# Patient Record
Sex: MALE | Race: WHITE
[De-identification: names, ages, dates, MRNs, and addresses within clinical notes are randomized per-mention and may not be internally consistent; named-entity substitution may affect disease eponyms.]

---

## 2021-08-05 ENCOUNTER — HOSPITAL ENCOUNTER (INPATIENT)
Dept: HOSPITAL 11 - JP.ED | Age: 26
LOS: 2 days | Discharge: HOME | DRG: 263 | End: 2021-08-07
Attending: INTERNAL MEDICINE | Admitting: INTERNAL MEDICINE
Payer: COMMERCIAL

## 2021-08-05 DIAGNOSIS — K80.13: Primary | ICD-10-CM

## 2021-08-05 DIAGNOSIS — Z20.822: ICD-10-CM

## 2021-08-05 DIAGNOSIS — K42.0: ICD-10-CM

## 2021-08-05 DIAGNOSIS — F17.200: ICD-10-CM

## 2021-08-05 PROCEDURE — 0FT44ZZ RESECTION OF GALLBLADDER, PERCUTANEOUS ENDOSCOPIC APPROACH: ICD-10-PCS | Performed by: SURGERY

## 2021-08-05 PROCEDURE — 0WQF4ZZ REPAIR ABDOMINAL WALL, PERCUTANEOUS ENDOSCOPIC APPROACH: ICD-10-PCS | Performed by: SURGERY

## 2021-08-05 PROCEDURE — U0002 COVID-19 LAB TEST NON-CDC: HCPCS

## 2021-08-05 RX ADMIN — SODIUM CHLORIDE SCH MLS/HR: 9 INJECTION, SOLUTION INTRAVENOUS at 23:05

## 2021-08-05 RX ADMIN — SODIUM CHLORIDE SCH MLS/HR: 9 INJECTION, SOLUTION INTRAVENOUS at 16:49

## 2021-08-05 NOTE — CRLUS
For Patients:  As a result of the 21st Century Cures Act, medical imaging 

exams and procedure reports are released immediately into your electronic 

medical record.  You may view this report before your referring provider.  

If you have questions, please contact your health care provider.



INDICATION:



Right upper quadrant/epigastric abdominal pain. Diarrhea.



TECHNIQUE:



Right upper quadrant ultrasound.



FINDINGS:



Sludge and small sand like stones are identified within the gallbladder. 

There is a fold within the gallbladder of no clinical significance. The 

gallbladder wall is not thickened in general, however the gallbladder wall 

is measured at 5 mm which may be an overestimate. Additionally the 

gallbladder is distended measuring 11.3 cm in cephalocaudal extent. There 

is a questionable slender focus of fluid along one edge of the gallbladder 

wall. Reportedly there is a sonographic Olson`s sign. Clinical and 

laboratory correlation recommended. A HIDA scan may be helpful for further 

evaluation.



Mildly echogenic liver. No intrahepatic mass. No intrahepatic biliary 

ductal dilatation. The right hepatic lobe is prominent measuring 17.8 cm. 

Normal common bile duct of 6 mm. The main portal vein is patent with flow 

in the normal direction. The pancreas although incompletely visualized is 

normal where seen. The right kidney measures 11.2 x 5.4 x 6.3 cm. The right 

renal cortex measures 1.9 cm. The visualized inferior vena cava is 

unremarkable. No upper abdominal ascites.



IMPRESSION:



1. Stones and sludge are identified in the gallbladder. The gallbladder 

appears to be distended measuring 11.3 cm in cephalocaudal extent. This 

could reflect gallbladder hydrops.



2. Please note there is a reported sonographic Olson`s sign documented by 

the ultrasonographer. Clinical and laboratory correlation is recommended. A 

HIDA scan may be helpful. 



3. Questionable trace fluid near the gallbladder wall.



4. Mildly echogenic liver. Incomplete visualization of the pancreas. No 

hydronephrosis of the right kidney.



Dictated by Jonatan Conner MD @ 8/5/2021 11:24:06 AM



Signed by Dr. Jonatan Conner @ Aug  5 2021 11:24AM

## 2021-08-05 NOTE — PCM.HP.2
H&P History of Present Illness





- General


Date of Service: 08/05/21


Admit Problem/Dx: 


                           Admission Diagnosis/Problem





Admission Diagnosis/Problem      Cholecystitis








Source of Information: Patient, Family, Provider, RN Notes Reviewed


History Limitations: Reports: No Limitations





- History of Present Illness


Initial Comments - Free Text/Narative: 





Mr. Palm is a 25-year-old gentleman who was admitted through the emergency 

department with nausea and right upper quadrant abdominal pain secondary to 

acute cholecystitis.  He is noted symptoms of right upper quadrant abdominal 

pain intermittently over the past 2 weeks.  Pain became very severe early this 

morning and persisted until he came into the emergency department.  White blood 

cell count is modestly elevated, as are liver enzymes.  Ultrasound shows 

evidence of acute cholecystitis with wall thickening and stones within the gall

bladder.  He is otherwise very healthy and currently takes no medications.  He 

denies any prior abdominal surgery.  He has had previous surgery on his knee 

with general anesthetic and had no adverse reactions that he is aware of and 

there is no family history of adverse reaction to anesthesia.  He denies 

personal history of deep vein thrombosis or pulmonary embolism and has had no 

history of cardiac or pulmonary disease.


  ** Abdominal


Pain Score (Numeric/FACES): 6





- Related Data


Allergies/Adverse Reactions: 


                                    Allergies











Allergy/AdvReac Type Severity Reaction Status Date / Time


 


No Known Allergies Allergy   Verified 08/05/21 09:02











Home Medications: 


                                    Home Meds





NK [No Known Home Meds]  08/05/21 [History]











Past Medical History





- Past Health History


Medical/Surgical History: Denies Medical/Surgical History





- Infectious Disease History


Infectious Disease History: Reports: None





Social & Family History





- Tobacco Use


Tobacco Use Status *Q: Current Every Day Tobacco User


Years of Tobacco use: 7


Packs/Tins Daily: 1


Used Tobacco, but Quit: No





- Caffeine Use


Caffeine Use: Reports: Soda





- Recreational Drug Use


Recreational Drug Use: No





H&P Review of Systems





- Review of Systems:


Review Of Systems: See Below


General: Reports: Chills, Malaise, Diaphoresis.  Denies: Fever, Fatigue


HEENT: Reports: No Symptoms


Pulmonary: Reports: No Symptoms


Cardiovascular: Reports: No Symptoms


Gastrointestinal: Reports: Abdominal Pain, Nausea.  Denies: Constipation, 

Diarrhea, Difficulty Swallowing, Distension, Hematemesis, Hematochezia, Melena, 

Vomiting


Genitourinary: Reports: No Symptoms


Musculoskeletal: Reports: No Symptoms


Skin: Reports: No Symptoms


Psychiatric: Reports: No Symptoms


Neurological: Reports: No Symptoms


Hematologic/Lymphatic: Reports: No Symptoms


Immunologic: Reports: No Symptoms





Exam





- Exam


Exam: See Below





- Vital Signs


Vital Signs: 


                                Last Vital Signs











Temp  97.7 F   08/05/21 09:03


 


Pulse  65   08/05/21 09:03


 


Resp  16   08/05/21 09:03


 


BP  125/88   08/05/21 09:03


 


Pulse Ox  100   08/05/21 09:03











Weight: 235 lb 0.204 oz





- Exam


General: Alert, Oriented, Cooperative, Moderate Distress


HEENT: PERRLA, Conjunctiva Clear, EACs Clear, Hearing Intact, Mucosa Moist & 

Pink, Normal Nasal Septum, Posterior Pharynx Clear, Pupils Equal


Neck: Supple, Trachea Midline, +2 Carotid Pulse wo Bruit


Lungs: Clear to Auscultation, Normal Respiratory Effort


Cardiovascular: Regular Rate, Regular Rhythm, Normal S1, Normal S2.  No: Syst

olic Murmur, Diastolic Murmur


GI/Abdominal Exam: Soft, Non-Tender, No Organomegaly, No Distention


Back Exam: Normal Inspection, Full Range of Motion


Extremities: Non-Tender, No Pedal Edema


Skin: Warm, Dry, Intact


Neurological: Cranial Nerves Intact, Strength Equal Bilateral, Normal Speech, 

Normal Tone, Sensation Intact.  No: Focal Deficit


Neuro Extensive - Mental Status: Alert, Oriented x3, Normal Mood/Affect, Normal 

Cognition, Memory Intact





- Patient Data


Lab Results Last 24 hrs: 


                         Laboratory Results - last 24 hr











  08/05/21 08/05/21 Range/Units





  09:55 09:55 


 


WBC  11.4 H   (4.5-11.0)  K/uL


 


RBC  5.17   (4.30-5.90)  M/uL


 


Hgb  15.0   (12.0-15.0)  g/dL


 


Hct  43.8   (40.0-54.0)  %


 


MCV  85   (80-98)  fL


 


MCH  29   (27-31)  pg


 


MCHC  34   (32-36)  %


 


Plt Count  262   (150-400)  K/uL


 


Sodium   140  (140-148)  mmol/L


 


Potassium   3.9  (3.6-5.2)  mmol/L


 


Chloride   102  (100-108)  mmol/L


 


Carbon Dioxide   27  (21-32)  mmol/L


 


Anion Gap   11.2  (5.0-14.0)  mmol/L


 


BUN   9  (7-18)  mg/dL


 


Creatinine   1.0  (0.8-1.3)  mg/dL


 


Est Cr Clr Drug Dosing   141.05  mL/min


 


Estimated GFR (MDRD)   > 60  (>60)  


 


Glucose   110 H  ()  mg/dL


 


Calcium   9.1  (8.5-10.1)  mg/dL


 


Total Bilirubin   1.1 H  (0.2-1.0)  mg/dL


 


AST   198 H  (15-37)  U/L


 


ALT   166 H  (12-78)  U/L


 


Alkaline Phosphatase   109  ()  U/L


 


C-Reactive Protein   < 0.05  (0.0-0.3)  mg/dL


 


Total Protein   8.1  (6.4-8.2)  g/dL


 


Albumin   4.1  (3.4-5.0)  g/dL


 


Globulin   4.0 H  (2.3-3.5)  g/dL


 


Albumin/Globulin Ratio   1.0 L  (1.2-2.2)  











Result Diagrams: 


                                 08/05/21 09:55





                                 08/05/21 09:55





Sepsis Event Note





- Evaluation


Sepsis Screening Result: No Definite Risk





- Focused Exam


Vital Signs: 


                                   Vital Signs











  Temp Pulse Resp BP Pulse Ox


 


 08/05/21 09:03  97.7 F  65  16  125/88  100


 


 08/05/21 09:02  97.7 F  65  16  125/88  100














*Q Meaningful Use (ADM)





- VTE *Q


VTE Pharmacological Contraindications *Q: Patient Scheduled Surgery





- VTE Risk Assess *Q


Each Risk Factor Represents 1 Point: Obesity ( BMI > 25 kg/m2)


Total Score 1 Point Risk Factors: 1


Each Risk Factor Represents 2 Points: None


Total Score 2 Point Risk Factors: 0


Each Risk Factor Represents 3 Points: None


Total Score 3 Point Risk Factors: 0


Each Risk Factor Represents 5 Points: None


Total Score 5 Point Risk Factors: 0


Venous Thromboembolism Risk Factor Score *Q: 1


Problem List Initiated/Reviewed/Updated: Yes


Orders Last 24hrs: 


                               Active Orders 24 hr











 Category Date Time Status


 


 Patient Status Manage Transfer [TRANSFER] Routine ADT  08/05/21 11:04 Ordered


 


 Gallbladder [Abdomen Ltd] [US] Stat Exams  08/05/21 09:42 Taken


 


 UA W/MICROSCOPIC [URIN] Stat Lab  08/05/21 09:42 Ordered


 


 Ampicillin/Sulbactam Na [Unasyn] 3 gm Med  08/05/21 11:15 Active





 Sodium Chloride 0.9% [Normal Saline] 100 ml   





 IV Q6H   


 


 Resuscitation Status Routine Resus Stat  08/05/21 11:05 Ordered








                                Medication Orders





Ampicillin Sodium/Sulbactam (Sodium 3 gm/ Sodium Chloride)  100 mls @ 200 mls/hr

IV Q6H [facility]








Assessment/Plan Comment:: 





ASSESSMENT AND PLAN





ACUTE CHOLECYSTITIS-symptoms present over the past 2 weeks on an intermittent 

basis.  More severe since early this morning.  Liver enzymes elevated and 

evidence of cholecystitis noted on ultrasound.


-Clear liquid diet


-N.p.o. after midnight


-IV fluids for hydration


-Medication for pain and nausea as needed


-Unasyn 1.5 g IV every 6 hours


-Consult Dr. Colin for surgical opinion





MAINTENANCE ISSUES


-DVT prophylaxis; SCUDs


-GI prophylaxis; not indicated


-[place] catheter; not indicated


-Nutrition; clear liquid diet, n.p.o. after midnight


-Nicotine dependence; nicotine patch





CODE STATUS-full code





ADMISSION STATUS-patient will be admitted to inpatient status, expect at least a

2 night hospital stay for evaluation and management of problems as outlined 

above.  At the time of this admission I do not reasonably expected evaluation 

and management of this problem will require more than a 96 hour hospital stay.





DISPOSITION-anticipate discharge to home after the hospital stay.





- Mortality Measure


Prognosis:: Good

## 2021-08-05 NOTE — EDM.PDOC
ED HPI GENERAL MEDICAL PROBLEM





- General


Chief Complaint: Abdominal Pain


Stated Complaint: STOMACH PAIN


Time Seen by Provider: 08/05/21 09:30


Source of Information: Reports: Patient, RN Notes Reviewed





- History of Present Illness


INITIAL COMMENTS - FREE TEXT/NARRATIVE: 





25-year-old male who 1 week ago was awakened at night with crampy abdominal pain

persistent for the most part in the right upper quadrant that lasted an hour or 

2 accompanied by some diarrhea and went away.  He had not eaten anything unusual

and never experienced such a thing before


Last night after having [employer] for dinner, he awakened again with pain in 

the right upper quadrant and epigastric area with nausea but no vomiting.  Pain 

has been persistent and still there and fluctuating in intensity relatively 

severe at times and worse when he is trying to stand upright or walk.  Also has 

had loose stool accompanying this time.  No fever or chills no trauma nothing 

new in his life and otherwise normal healthy male


  ** Abdominal


Pain Score (Numeric/FACES): 6





- Related Data


                                    Allergies











Allergy/AdvReac Type Severity Reaction Status Date / Time


 


No Known Allergies Allergy   Verified 08/05/21 09:02











Home Meds: 


                                    Home Meds





NK [No Known Home Meds]  08/05/21 [History]











Past Medical History





- Past Health History


Medical/Surgical History: Denies Medical/Surgical History





- Infectious Disease History


Infectious Disease History: Reports: None





Social & Family History





- Tobacco Use


Tobacco Use Status *Q: Current Every Day Tobacco User


Years of Tobacco use: 7


Packs/Tins Daily: 1


Used Tobacco, but Quit: No





- Caffeine Use


Caffeine Use: Reports: Soda





- Recreational Drug Use


Recreational Drug Use: No





ED ROS GENERAL





- Review of Systems


Review Of Systems: Comprehensive ROS is negative, except as noted in HPI.





ED EXAM, GI/ABD





- Physical Exam


Exam: See Below


Text/Narrative:: 





Alert cooperative 25-year-old male lying on the gurney who does not appear to be

 acutely distressed with normal vital signs.


HEENT appears to be normal


Neck is supple normal range of motion


Chest clear regular rate and rhythm no abnormal sounds


Abdomen is tender on palpation in the right upper quadrant and epigastric area 

and he resists touching in that area.  The rest of the abdomen I can push deeply

 without discomfort and no rebound.  No masses are felt.  Pain is worse in the 

right upper quadrant on inspiration.  Skin extremities appear to be otherwise 

normal





Course





- Vital Signs


Text/Narrative:: 





Bilirubin is 1.1 and liver enzymes are elevated


X-ray tech reports on ultrasound that there are multiple stones in the 

gallbladder with thickening of the gallbladder wall and some dilated duct





Dr. Colin was contacted and surgery at 11:00 and agrees to take gallbladder out 

which she will do tomorrow morning with Unasyn in the meantime.  Dr. Owen the

 hospitalist agrees to admit


Last Recorded V/S: 


                                Last Vital Signs











Temp  36.5 C   08/05/21 09:03


 


Pulse  65   08/05/21 09:03


 


Resp  16   08/05/21 09:03


 


BP  125/88   08/05/21 09:03


 


Pulse Ox  100   08/05/21 09:03














- Orders/Labs/Meds


Orders: 


                               Active Orders 24 hr











 Category Date Time Status


 


 Gallbladder [Abdomen Ltd] [US] Stat Exams  08/05/21 09:42 Ordered


 


 UA W/MICROSCOPIC [URIN] Stat Lab  08/05/21 09:42 Ordered











Labs: 


                                Laboratory Tests











  08/05/21 08/05/21 Range/Units





  09:55 09:55 


 


WBC  11.4 H   (4.5-11.0)  K/uL


 


RBC  5.17   (4.30-5.90)  M/uL


 


Hgb  15.0   (12.0-15.0)  g/dL


 


Hct  43.8   (40.0-54.0)  %


 


MCV  85   (80-98)  fL


 


MCH  29   (27-31)  pg


 


MCHC  34   (32-36)  %


 


Plt Count  262   (150-400)  K/uL


 


Sodium   140  (140-148)  mmol/L


 


Potassium   3.9  (3.6-5.2)  mmol/L


 


Chloride   102  (100-108)  mmol/L


 


Carbon Dioxide   27  (21-32)  mmol/L


 


Anion Gap   11.2  (5.0-14.0)  mmol/L


 


BUN   9  (7-18)  mg/dL


 


Creatinine   1.0  (0.8-1.3)  mg/dL


 


Est Cr Clr Drug Dosing   141.05  mL/min


 


Estimated GFR (MDRD)   > 60  (>60)  


 


Glucose   110 H  ()  mg/dL


 


Calcium   9.1  (8.5-10.1)  mg/dL


 


Total Bilirubin   1.1 H  (0.2-1.0)  mg/dL


 


AST   198 H  (15-37)  U/L


 


ALT   166 H  (12-78)  U/L


 


Alkaline Phosphatase   109  ()  U/L


 


C-Reactive Protein   < 0.05  (0.0-0.3)  mg/dL


 


Total Protein   8.1  (6.4-8.2)  g/dL


 


Albumin   4.1  (3.4-5.0)  g/dL


 


Globulin   4.0 H  (2.3-3.5)  g/dL


 


Albumin/Globulin Ratio   1.0 L  (1.2-2.2)  











Meds: 


Medications














Discontinued Medications














Generic Name Dose Route Start Last Admin





  Trade Name Joseq  PRN Reason Stop Dose Admin


 


Fentanyl  100 mcg  08/05/21 10:06  08/05/21 10:12





  Fentanyl 100 Mcg/2 Ml Sdv  IVPUSH  08/05/21 10:07  100 mcg





  ONETIME ONE   Administration


 


Sodium Chloride  1,000 mls @ 1,000 mls/hr  08/05/21 09:43  08/05/21 09:59





  Normal Saline  IV  08/05/21 10:42  1,000 mls/hr





  .BOLUS ONE   Administration














Departure





- Departure


Time of Disposition: 11:00


Disposition: Admitted As Inpatient 68


Clinical Impression: 


 Abdominal pain, Cholecystitis, Cholelithiasis and acute cholecystitis with 

obstruction








- Discharge Information


Referrals: 


PCP,None [Primary Care Provider] - 


Forms:  ED Department Discharge





Sepsis Event Note (ED)





- Evaluation


Sepsis Screening Result: No Definite Risk





- Focused Exam


Vital Signs: 


                                   Vital Signs











  Temp Pulse Resp BP Pulse Ox


 


 08/05/21 09:03  36.5 C  65  16  125/88  100


 


 08/05/21 09:02  36.5 C  65  16  125/88  100














- My Orders


Last 24 Hours: 


My Active Orders





08/05/21 09:42


Gallbladder [Abdomen Ltd] [US] Stat 


UA W/MICROSCOPIC [URIN] Stat 














- Assessment/Plan


Last 24 Hours: 


My Active Orders





08/05/21 09:42


Gallbladder [Abdomen Ltd] [US] Stat 


UA W/MICROSCOPIC [URIN] Stat

## 2021-08-06 RX ADMIN — HYDROCODONE BITARTRATE AND ACETAMINOPHEN PRN TAB: 5; 325 TABLET ORAL at 22:29

## 2021-08-06 RX ADMIN — SODIUM CHLORIDE SCH MLS/HR: 9 INJECTION, SOLUTION INTRAVENOUS at 05:24

## 2021-08-06 RX ADMIN — AMPICILLIN SODIUM AND SULBACTAM SODIUM SCH MLS/HR: 2; 1 INJECTION, POWDER, FOR SOLUTION INTRAMUSCULAR; INTRAVENOUS at 10:29

## 2021-08-06 RX ADMIN — AMPICILLIN SODIUM AND SULBACTAM SODIUM SCH MLS/HR: 2; 1 INJECTION, POWDER, FOR SOLUTION INTRAMUSCULAR; INTRAVENOUS at 22:29

## 2021-08-06 RX ADMIN — AMPICILLIN SODIUM AND SULBACTAM SODIUM SCH MLS/HR: 2; 1 INJECTION, POWDER, FOR SOLUTION INTRAMUSCULAR; INTRAVENOUS at 15:44

## 2021-08-07 RX ADMIN — AMPICILLIN SODIUM AND SULBACTAM SODIUM SCH MLS/HR: 2; 1 INJECTION, POWDER, FOR SOLUTION INTRAMUSCULAR; INTRAVENOUS at 04:53

## 2021-08-07 RX ADMIN — HYDROCODONE BITARTRATE AND ACETAMINOPHEN PRN TAB: 5; 325 TABLET ORAL at 07:52

## 2021-08-07 RX ADMIN — AMPICILLIN SODIUM AND SULBACTAM SODIUM SCH MLS/HR: 2; 1 INJECTION, POWDER, FOR SOLUTION INTRAMUSCULAR; INTRAVENOUS at 10:15

## 2021-08-08 NOTE — DISCH
FINAL DIAGNOSES:

1. Subacute and chronic cholecystitis and cholelithiasis.

2. Left umbilical hernia.

 

OPERATIVE PROCEDURES:  Done on 08/06, laparoscopic cholecystectomy and umbilical hernia

repair.

 

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

with a picture of acute cholecystitis.  The patient had increasing pain, and ultrasound

showed thickened gallbladder wall along with cholelithiasis.  After admission, the patient

underwent a laparoscopic cholecystectomy.  Postoperatively, his labs look good, and he is

tolerating diet.  He will be sent home with Norco 5/325 one tablet q.4 hours p.r.n. pain,

#12, along with Tylenol and ibuprofen in addition to that as needed, and diet will be as

tolerated.  He will follow up with Negar Landry at [facility] on Friday, 08/13/2021,

at 9:30 a.m.

 

DD:  08/07/2021 10:02:11

DT:  08/08/2021 12:10:42

Job #:  [id number]

## 2021-08-16 NOTE — OR
DATE OF PROCEDURE:  08/06/2021

 

SURGEON:  Jose Colin MD

 

PREOPERATIVE DIAGNOSIS:  Subacute cholecystitis and cholelithiasis.

 

POSTOPERATIVE DIAGNOSES:

1. Subacute cholecystitis and cholelithiasis.

2. Incarcerated umbilical hernia.

 

OPERATIVE PROCEDURE:  Diagnostic laparoscopy with:

1. Laparoscopic cholecystectomy (28530).

2. Repair of incarcerated umbilical hernia (37814).

 

ANESTHESIA:  General.

 

INDICATIONS FOR PROCEDURE:  This is a 25-year-old admitted with a picture of an acute or

subacute cholecystitis.  The patient was admitted overnight and IV antibiotics initiated and

the patient then is to undergo a laparoscopic or necessary open cholecystectomy.  Potential

risks of the procedure including bleeding, infection, injury to underlying viscera, possible

problems with stones migrating into the common bile duct requiring additional procedures for

correction were gone over and the patient wishes to proceed.

 

DETAILS OF PROCEDURE:  The patient was taken to the operating room and placed in the supine

position.  After general endotracheal anesthesia was induced, the abdomen was prepped and

draped.  Initially, a transverse epigastric incision was made and peritoneal cavity entered

under direct vision with an Optiview trocar, inflated to 15 mmHg pressure with CO2.

Laparoscope was reinserted.  The scope was visualized down towards the umbilicus.  The

patient was noted to have an incarcerated umbilical hernia.  The fascia defect was quite

small perhaps around 8 mm and this appeared to contain some incarcerated preperitoneal fat.

This was then palpated externally.  Transverse incision was then made just inferior to the

umbilical skin fold and the 12 mm trocar was passed through the hernia thus displacing the

preperitoneal fat during the course of the insertion of the trocar.  One additional 5 mm

trocar was then placed in the right subcostal area.  Bilateral transversus abdominis plane

blocks were then placed.

 

The gallbladder as expected was noted to be densely distended and thick walled consistent

with a subacute cholecystitis.  This was pulled up laterally and anteriorly and dissection

began on the gallbladder neck with Harmonic scalpel and continued around the gallbladder

neck until the gallbladder neck and cystic duct junction and adjacent cystic artery were

both identified.  The cystic duct was then divided more or less flush with the gallbladder

using a BIENVENIDO purple load and the artery was then also taken with a BIENVENIDO sung load.  The

gallbladder was then dissected off the gallbladder bed using Harmonic scalpel and delivered

through the epigastric trocar site.

 

At this point, no further problems were noted.  Saeid-Avalos drain was taken out through

the right lateral trocar site, and at that point, the fascia at the epigastric incision was

closed with some 0 Vicryl stitch and initially these were __________ replaced and the camera

brought up to the epigastric trocar site and the umbilical hernia then closed with a series

of 0

Vicryl sutures closing the hernia with a transverse orientation.  The sutures were brought

through the abdominal wall with the laparoscopic suture passer.  At that point, remaining

trocars were removed and the peritoneal cavity deflated and the fascial incision was closed

with 0 Vicryl stitch and the skin with 4-0 Vicryl subcuticular stitch.  Dressing was

applied.  The patient was taken to the recovery room in satisfactory condition.  There were

no evident complications.

 

 

 

 

Jose Colin MD

DD:  08/15/2021 10:38:36

DT:  08/16/2021 13:37:17

Job #:  [id number]